# Patient Record
Sex: FEMALE | Race: WHITE | NOT HISPANIC OR LATINO | Employment: PART TIME | URBAN - METROPOLITAN AREA
[De-identification: names, ages, dates, MRNs, and addresses within clinical notes are randomized per-mention and may not be internally consistent; named-entity substitution may affect disease eponyms.]

---
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
## 2018-01-22 ENCOUNTER — NON-PROVIDER VISIT (OUTPATIENT)
Dept: MEDICAL GROUP | Facility: MEDICAL CENTER | Age: 18
End: 2018-01-22
Payer: COMMERCIAL

## 2018-01-22 DIAGNOSIS — Z30.42 DEPO-PROVERA CONTRACEPTIVE STATUS: ICD-10-CM

## 2018-01-22 PROCEDURE — 96372 THER/PROPH/DIAG INJ SC/IM: CPT | Performed by: NURSE PRACTITIONER

## 2018-01-22 RX ORDER — MEDROXYPROGESTERONE ACETATE 150 MG/ML
150 INJECTION, SUSPENSION INTRAMUSCULAR ONCE
Status: COMPLETED | OUTPATIENT
Start: 2018-01-22 | End: 2018-01-22

## 2018-01-22 RX ADMIN — MEDROXYPROGESTERONE ACETATE 150 MG: 150 INJECTION, SUSPENSION INTRAMUSCULAR at 09:57

## 2018-01-22 NOTE — PROGRESS NOTES
Willam Cunha is a 18 y.o. female here for a non-provider visit for Depo -Provera (medroxyprogesterone)  injection.    Reason for injection: Pt due   Order in MAR?: Yes  Patient supplied?:No  Minimum interval has been met for this injection (per MAR order): Yes    Order and dose verified by: Agustina Medeiros  Patient tolerated injection and no adverse effects were observed or reported: Yes    # of Administrations remaining in MAR: None     Pt due for next injection between April 9 - April 23

## 2018-04-26 ENCOUNTER — NON-PROVIDER VISIT (OUTPATIENT)
Dept: MEDICAL GROUP | Facility: MEDICAL CENTER | Age: 18
End: 2018-04-26
Payer: COMMERCIAL

## 2018-04-26 DIAGNOSIS — Z30.42 DEPO-PROVERA CONTRACEPTIVE STATUS: ICD-10-CM

## 2018-04-26 DIAGNOSIS — Z30.49 ENCOUNTER FOR SURVEILLANCE OF OTHER CONTRACEPTIVE: ICD-10-CM

## 2018-04-26 LAB
INT CON NEG: NEGATIVE
INT CON POS: POSITIVE
POC URINE PREGNANCY TEST: NEGATIVE

## 2018-04-26 PROCEDURE — 96372 THER/PROPH/DIAG INJ SC/IM: CPT | Performed by: INTERNAL MEDICINE

## 2018-04-26 PROCEDURE — 81025 URINE PREGNANCY TEST: CPT | Performed by: NURSE PRACTITIONER

## 2018-04-26 RX ORDER — MEDROXYPROGESTERONE ACETATE 150 MG/ML
150 INJECTION, SUSPENSION INTRAMUSCULAR ONCE
Status: COMPLETED | OUTPATIENT
Start: 2018-04-26 | End: 2018-04-26

## 2018-04-26 RX ADMIN — MEDROXYPROGESTERONE ACETATE 150 MG: 150 INJECTION, SUSPENSION INTRAMUSCULAR at 16:33

## 2018-04-26 NOTE — PROGRESS NOTES
Willam Cunha is a 18 y.o. here for a Depo Provera Injection.     Date of last Depo Provera Injection: 01/22/2018  Current date within therapeutic range?: No   Urine pregnancy test done (needed if out of date range): yes--Negative  Date of office visit:04/26/2018  Date of last pap (if > 21 years old)/ GYN exam: N/A  Dx: Contraceptive use    Order and dose verified by: XAVI Avalos  Patient tolerated injection and no adverse effects were observed or reported: Yes    # of Administrations remaining in MAR: 0  Next injection due between 07/12/2018 and 07/26/2018.

## 2018-07-08 ENCOUNTER — OFFICE VISIT (OUTPATIENT)
Dept: URGENT CARE | Facility: CLINIC | Age: 18
End: 2018-07-08
Payer: COMMERCIAL

## 2018-07-08 VITALS
SYSTOLIC BLOOD PRESSURE: 110 MMHG | BODY MASS INDEX: 24.59 KG/M2 | TEMPERATURE: 99 F | HEIGHT: 62 IN | RESPIRATION RATE: 16 BRPM | OXYGEN SATURATION: 100 % | DIASTOLIC BLOOD PRESSURE: 72 MMHG | WEIGHT: 133.6 LBS | HEART RATE: 95 BPM

## 2018-07-08 DIAGNOSIS — R09.89 THROAT TIGHTNESS: ICD-10-CM

## 2018-07-08 DIAGNOSIS — R05.9 COUGH: ICD-10-CM

## 2018-07-08 DIAGNOSIS — J03.90 TONSILLITIS: ICD-10-CM

## 2018-07-08 PROCEDURE — 99214 OFFICE O/P EST MOD 30 MIN: CPT | Performed by: PHYSICIAN ASSISTANT

## 2018-07-08 RX ORDER — DEXAMETHASONE 4 MG/1
8 TABLET ORAL DAILY
Qty: 4 TAB | Refills: 0 | Status: SHIPPED | OUTPATIENT
Start: 2018-07-08 | End: 2018-07-10

## 2018-07-08 RX ORDER — CEFUROXIME AXETIL 500 MG/1
500 TABLET ORAL 2 TIMES DAILY
Qty: 14 TAB | Refills: 0 | Status: SHIPPED | OUTPATIENT
Start: 2018-07-08 | End: 2018-07-15

## 2018-07-08 ASSESSMENT — ENCOUNTER SYMPTOMS
WHEEZING: 0
COUGH: 1
SORE THROAT: 1
CONSTITUTIONAL NEGATIVE: 1
SHORTNESS OF BREATH: 0
CARDIOVASCULAR NEGATIVE: 1
FEVER: 0
EYES NEGATIVE: 1

## 2018-07-08 NOTE — PROGRESS NOTES
"Subjective:      Willam Cunha is a 18 y.o. female who presents with Cough and Pharyngitis (1 week)            Cough   This is a new problem. The current episode started in the past 7 days. The problem has been unchanged. The problem occurs every few minutes. The cough is productive of sputum. Associated symptoms include a sore throat. Pertinent negatives include no fever, shortness of breath or wheezing. Nothing aggravates the symptoms. She has tried nothing for the symptoms. The treatment provided no relief. There is no history of asthma.   Pharyngitis    Associated symptoms include coughing. Pertinent negatives include no shortness of breath.       Review of Systems   Constitutional: Negative.  Negative for fever.   HENT: Positive for sore throat.         +phar./tons redn     Eyes: Negative.    Respiratory: Positive for cough. Negative for shortness of breath and wheezing.    Cardiovascular: Negative.    Skin: Negative.           Objective:     /72   Pulse 95   Temp 37.2 °C (99 °F)   Resp 16   Ht 1.575 m (5' 2\")   Wt 60.6 kg (133 lb 9.6 oz)   SpO2 100%   BMI 24.44 kg/m²      Physical Exam   Constitutional: She is oriented to person, place, and time. She appears well-developed and well-nourished. No distress.   HENT:   Head: Normocephalic and atraumatic.   Mouth/Throat: No oropharyngeal exudate.   +phar./tons redn     Eyes: Conjunctivae and EOM are normal. Pupils are equal, round, and reactive to light.   Neck: Normal range of motion. Neck supple.   Cardiovascular: Normal rate and regular rhythm.    Pulmonary/Chest: Effort normal and breath sounds normal. No respiratory distress. She has no wheezes. She has no rales.   Lymphadenopathy:     She has cervical adenopathy (+submandib. tend adenopathy).   Neurological: She is alert and oriented to person, place, and time.   Skin: Skin is warm and dry. Capillary refill takes less than 2 seconds.   Psychiatric: She has a normal mood and affect. Her " behavior is normal.   Nursing note and vitals reviewed.    Active Ambulatory Problems     Diagnosis Date Noted   • Encounter for surveillance of contraceptive pills 09/28/2015   • Mild intermittent asthma without complication 09/28/2015   • Anxiety 09/28/2015   • Uses birth control 01/15/2016   • GERD (gastroesophageal reflux disease) 01/15/2016   • Family planning 04/27/2017   • Chronic left shoulder pain 12/18/2017     Resolved Ambulatory Problems     Diagnosis Date Noted   • No Resolved Ambulatory Problems     Past Medical History:   Diagnosis Date   • Anxiety 9/28/2015   • Asthma    • Encounter for surveillance of contraceptive pills 9/28/2015   • Heart murmur      Current Outpatient Prescriptions on File Prior to Visit   Medication Sig Dispense Refill   • albuterol (PROAIR HFA) 108 (90 BASE) MCG/ACT Aero Soln inhalation aerosol Inhale 2 Puffs by mouth every 6 hours as needed for Shortness of Breath. 8.5 g 3   • hydrOXYzine (ATARAX) 25 MG Tab Take 1 Tab by mouth 3 times a day as needed for Itching or Anxiety. 30 Tab 0     No current facility-administered medications on file prior to visit.      Patient has no known allergies.  .,f  Patient has no known allergies.              Assessment/Plan:     ·  cough, ST      · Start w/MucinexD; nsaids; [hold rx for abx prn [conditional use] if sx persist/worsen]  ·

## 2018-08-01 ENCOUNTER — OFFICE VISIT (OUTPATIENT)
Dept: URGENT CARE | Facility: CLINIC | Age: 18
End: 2018-08-01
Payer: COMMERCIAL

## 2018-08-01 VITALS
HEART RATE: 90 BPM | TEMPERATURE: 99.3 F | DIASTOLIC BLOOD PRESSURE: 74 MMHG | RESPIRATION RATE: 16 BRPM | HEIGHT: 62 IN | BODY MASS INDEX: 24.84 KG/M2 | SYSTOLIC BLOOD PRESSURE: 124 MMHG | WEIGHT: 135 LBS | OXYGEN SATURATION: 98 %

## 2018-08-01 DIAGNOSIS — I80.9 INFLAMMATION OF VEIN: ICD-10-CM

## 2018-08-01 PROCEDURE — 99213 OFFICE O/P EST LOW 20 MIN: CPT | Performed by: PHYSICIAN ASSISTANT

## 2018-08-01 RX ORDER — METHYLPREDNISOLONE 4 MG/1
TABLET ORAL
Qty: 1 TAB | Refills: 0 | Status: SHIPPED | OUTPATIENT
Start: 2018-08-01 | End: 2018-09-04

## 2018-08-01 ASSESSMENT — ENCOUNTER SYMPTOMS
NEUROLOGICAL NEGATIVE: 1
CONSTITUTIONAL NEGATIVE: 1
WEAKNESS: 0
FEVER: 0
MUSCULOSKELETAL NEGATIVE: 1
NUMBNESS: 0

## 2018-08-02 NOTE — PROGRESS NOTES
"Subjective:      Willam Cunha is a 18 y.o. female who presents with Ankle Problem (x2days, vein on right ankle swollen, not going down, painful to the touch)            Other   This is a new (r ankle vein inflamed?, swollen; some soreness in area) problem. The current episode started today. The problem occurs constantly. The problem has been unchanged. Pertinent negatives include no fever, numbness or weakness. Nothing aggravates the symptoms. She has tried nothing for the symptoms. The treatment provided no relief.       Review of Systems   Constitutional: Negative.  Negative for fever.   Musculoskeletal: Negative.    Skin: Negative.    Neurological: Negative.  Negative for weakness and numbness.          Objective:     /74   Pulse 90   Temp 37.4 °C (99.3 °F)   Resp 16   Ht 1.575 m (5' 2\")   Wt 61.2 kg (135 lb)   SpO2 98%   BMI 24.69 kg/m²      Physical Exam   Constitutional: She is oriented to person, place, and time. She appears well-developed and well-nourished. No distress.   Musculoskeletal: She exhibits tenderness (r anteromedial ankle area, has a distended vein, itself no hard/cord like but gen ttp under/around vein area; no foot/ankle swelling). She exhibits no edema.   Neurological: She is alert and oriented to person, place, and time. She exhibits normal muscle tone. Coordination normal.   Skin: Skin is warm and dry. Capillary refill takes less than 2 seconds.   Psychiatric: She has a normal mood and affect. Her behavior is normal.   Nursing note and vitals reviewed.    Active Ambulatory Problems     Diagnosis Date Noted   • Encounter for surveillance of contraceptive pills 09/28/2015   • Mild intermittent asthma without complication 09/28/2015   • Anxiety 09/28/2015   • Uses birth control 01/15/2016   • GERD (gastroesophageal reflux disease) 01/15/2016   • Family planning 04/27/2017   • Chronic left shoulder pain 12/18/2017     Resolved Ambulatory Problems     Diagnosis Date Noted "   • No Resolved Ambulatory Problems     Past Medical History:   Diagnosis Date   • Anxiety 9/28/2015   • Asthma    • Encounter for surveillance of contraceptive pills 9/28/2015   • Heart murmur      Current Outpatient Prescriptions on File Prior to Visit   Medication Sig Dispense Refill   • albuterol (PROAIR HFA) 108 (90 BASE) MCG/ACT Aero Soln inhalation aerosol Inhale 2 Puffs by mouth every 6 hours as needed for Shortness of Breath. 8.5 g 3   • hydrOXYzine (ATARAX) 25 MG Tab Take 1 Tab by mouth 3 times a day as needed for Itching or Anxiety. 30 Tab 0     No current facility-administered medications on file prior to visit.      Social History     Social History   • Marital status: Single     Spouse name: N/A   • Number of children: N/A   • Years of education: N/A     Occupational History   • Not on file.     Social History Main Topics   • Smoking status: Never Smoker   • Smokeless tobacco: Never Used   • Alcohol use 0.0 oz/week      Comment: rare   • Drug use: No   • Sexual activity: Yes     Partners: Male     Birth control/ protection: Pill     Other Topics Concern   • Not on file     Social History Narrative   • No narrative on file     Family History   Problem Relation Age of Onset   • Diabetes Father    • Hypertension Maternal Grandmother    • Diabetes Maternal Grandfather    • Heart Disease Maternal Grandfather         chf     Patient has no known allergies.              Assessment/Plan:     ·  r ankle vein distension      · Trial rx meds;

## 2018-09-04 ENCOUNTER — HOSPITAL ENCOUNTER (OUTPATIENT)
Dept: LAB | Facility: MEDICAL CENTER | Age: 18
End: 2018-09-04
Attending: PHYSICIAN ASSISTANT
Payer: COMMERCIAL

## 2018-09-04 ENCOUNTER — OFFICE VISIT (OUTPATIENT)
Dept: MEDICAL GROUP | Facility: PHYSICIAN GROUP | Age: 18
End: 2018-09-04
Payer: COMMERCIAL

## 2018-09-04 VITALS
DIASTOLIC BLOOD PRESSURE: 62 MMHG | SYSTOLIC BLOOD PRESSURE: 110 MMHG | RESPIRATION RATE: 16 BRPM | OXYGEN SATURATION: 94 % | TEMPERATURE: 99.6 F | WEIGHT: 137.2 LBS | HEART RATE: 102 BPM | HEIGHT: 62 IN | BODY MASS INDEX: 25.25 KG/M2

## 2018-09-04 DIAGNOSIS — N91.2 AMENORRHEA: ICD-10-CM

## 2018-09-04 LAB — HCG SERPL QL: NEGATIVE

## 2018-09-04 PROCEDURE — 99213 OFFICE O/P EST LOW 20 MIN: CPT | Performed by: PHYSICIAN ASSISTANT

## 2018-09-04 PROCEDURE — 36415 COLL VENOUS BLD VENIPUNCTURE: CPT

## 2018-09-04 PROCEDURE — 84703 CHORIONIC GONADOTROPIN ASSAY: CPT

## 2018-09-04 ASSESSMENT — PAIN SCALES - GENERAL: PAINLEVEL: NO PAIN

## 2018-09-04 ASSESSMENT — PATIENT HEALTH QUESTIONNAIRE - PHQ9: CLINICAL INTERPRETATION OF PHQ2 SCORE: 0

## 2018-09-04 NOTE — ASSESSMENT & PLAN NOTE
Patient states she has not had a menstrual period in 2 months. She is concerned about being pregnant. She mentions that she stopped Depo-Provera 2 months ago and had unprotected sex 1-2 days after Depo-Provera .  States she took an at home pregnancy test 1.5 weeks ago and results were negative. States she has been experiencing intermittent episodes of a tugging sensation in pelvic region and generalized abdominal discomfort.  Denies nausea, vomiting, breast tenderness, nipple discharge or smell sensitivity.  Patient is requesting hCG testing.  She tells me that her mother tested negative with over-the-counter pregnancy test.

## 2018-09-04 NOTE — PROGRESS NOTES
Chief Complaint   Patient presents with   • Labs Only     requesting HCG labs        HISTORY OF PRESENT ILLNESS: Willam Cunha is an established 18 y.o. female here to discuss the evaluation and management of:    Amenorrhea  Patient states she has not had a menstrual period in 2 months. She is concerned about being pregnant. She mentions that she stopped Depo-Provera 2 months ago and had unprotected sex 1-2 days after Depo-Provera .  States she took an at home pregnancy test 1.5 weeks ago and results were negative. States she has been experiencing intermittent episodes of a tugging sensation in pelvic region and generalized abdominal discomfort.  Denies nausea, vomiting, breast tenderness, nipple discharge or smell sensitivity.  Patient is requesting hCG testing.  She tells me that her mother tested negative with over-the-counter pregnancy test.      Patient Active Problem List    Diagnosis Date Noted   • Amenorrhea 2018   • Chronic left shoulder pain 2017   • Family planning 2017   • Uses birth control 01/15/2016   • GERD (gastroesophageal reflux disease) 01/15/2016   • Encounter for surveillance of contraceptive pills 2015   • Mild intermittent asthma without complication 2015   • Anxiety 2015       Allergies:Patient has no known allergies.    No current outpatient prescriptions on file.     No current facility-administered medications for this visit.        Social History   Substance Use Topics   • Smoking status: Never Smoker   • Smokeless tobacco: Never Used   • Alcohol use 0.0 oz/week      Comment: Socially.        Family Status   Relation Status   • Mo Alive   • Fa Alive   • Bro Alive   • MGMo Alive   • MGFa Alive     Family History   Problem Relation Age of Onset   • Diabetes Father    • Hypertension Maternal Grandmother    • Diabetes Maternal Grandfather    • Heart Disease Maternal Grandfather         chf       ROS:  Review of Systems   Constitutional:  "Negative for fever, chills, weight loss and malaise/fatigue.   HENT: Negative for ear pain, nosebleeds, congestion, sore throat and neck pain.    Eyes: Negative for blurred vision.   Respiratory: Negative for cough, sputum production, shortness of breath and wheezing.    Cardiovascular: Negative for chest pain, palpitations, orthopnea and leg swelling.   Gastrointestinal: Negative for heartburn, nausea, vomiting and abdominal pain.   Genitourinary: Negative for dysuria, urgency and frequency.  Positive for amenorrhea.  Musculoskeletal: Negative for myalgias, back pain and joint pain.   Skin: Negative for rash and itching.   Neurological: Negative for dizziness, tingling, tremors, sensory change, focal weakness and headaches.   Endo/Heme/Allergies: Does not bruise/bleed easily.   Psychiatric/Behavioral: Negative for depression, suicidal ideas and memory loss.  The patient is not nervous/anxious and does not have insomnia.    All other systems reviewed and are negative except as in HPI.    Exam: Blood pressure 110/62, pulse (!) 102, temperature 37.6 °C (99.6 °F), resp. rate 16, height 1.575 m (5' 2\"), weight 62.2 kg (137 lb 3.2 oz), SpO2 94 %. Body mass index is 25.09 kg/m².  General: Normal appearing. No distress.  HEENT: Normocephalic. Eyes conjunctiva clear lids without ptosis, pupils equal and reactive to light accommodation, ears normal shape and contour, canals are clear bilaterally, tympanic membranes are benign, nasal mucosa benign, oropharynx is without erythema, edema or exudates.   Neck: Supple without JVD or bruit. Thyroid is not enlarged.  Pulmonary: Clear to ausculation.  Normal effort. No rales, ronchi, or wheezing.  Cardiovascular: Regular rate and rhythm without murmur.  Abdomen: Soft, nontender, nondistended. Normal bowel sounds. Liver and spleen are not palpable  Neurologic: Grossly nonfocal.  Cranial nerves are normal.   Lymph: No cervical, supraclavicular or axillary lymph nodes are " palpable  Skin: Warm and dry.  No rashes or suspicious skin lesions.  Musculoskeletal: Normal gait. No extremity cyanosis, clubbing, or edema.  Psych: Normal mood and affect. Alert and oriented x3. Judgment and insight is normal.    Medical decision-making and discussion:  1. Amenorrhea  HCG qualitative serum test has been ordered to further evaluate patient.  Patient will be contacted with results.  Patient is inquiring about oral contraceptive as an birth control method if results are negative.  Advised patient to follow-up with her PCP to discuss oral birth control.      - HCG QUAL SERUM; Future      Please note that this dictation was created using voice recognition software. I have made every reasonable attempt to correct obvious errors, but I expect that there are errors of grammar and possibly content that I did not discover before finalizing the note.        Return if symptoms worsen or fail to improve.

## 2018-09-05 ENCOUNTER — TELEPHONE (OUTPATIENT)
Dept: MEDICAL GROUP | Facility: PHYSICIAN GROUP | Age: 18
End: 2018-09-05

## 2018-09-05 NOTE — TELEPHONE ENCOUNTER
Phone Number Called: [phone] (home)       Message: Pt informed by phone.    Left Message for patient to call back: N\A

## 2018-09-05 NOTE — TELEPHONE ENCOUNTER
----- Message from Kim Crane P.A.-C. sent at 9/4/2018  8:22 PM PDT -----  Please call patient. I have reviewed patient's lab work results and results do not indicate patient is pregnant.    Thank you,    Milla QUEEN

## 2018-09-10 ENCOUNTER — OFFICE VISIT (OUTPATIENT)
Dept: MEDICAL GROUP | Facility: MEDICAL CENTER | Age: 18
End: 2018-09-10
Payer: COMMERCIAL

## 2018-09-10 VITALS
BODY MASS INDEX: 25.4 KG/M2 | TEMPERATURE: 98.6 F | HEIGHT: 62 IN | WEIGHT: 138 LBS | RESPIRATION RATE: 16 BRPM | OXYGEN SATURATION: 98 % | DIASTOLIC BLOOD PRESSURE: 62 MMHG | HEART RATE: 98 BPM | SYSTOLIC BLOOD PRESSURE: 110 MMHG

## 2018-09-10 DIAGNOSIS — Z30.09 FAMILY PLANNING: ICD-10-CM

## 2018-09-10 DIAGNOSIS — Z23 NEED FOR INFLUENZA VACCINATION: ICD-10-CM

## 2018-09-10 PROCEDURE — 90686 IIV4 VACC NO PRSV 0.5 ML IM: CPT | Performed by: NURSE PRACTITIONER

## 2018-09-10 PROCEDURE — 99214 OFFICE O/P EST MOD 30 MIN: CPT | Mod: 25 | Performed by: NURSE PRACTITIONER

## 2018-09-10 PROCEDURE — 90471 IMMUNIZATION ADMIN: CPT | Performed by: NURSE PRACTITIONER

## 2018-09-10 RX ORDER — DROSPIRENONE AND ETHINYL ESTRADIOL 0.03MG-3MG
1 KIT ORAL DAILY
Qty: 84 TAB | Refills: 3 | Status: SHIPPED | OUTPATIENT
Start: 2018-09-10

## 2018-09-10 NOTE — PROGRESS NOTES
"Subjective:     Chief Complaint   Patient presents with   • Contraception     DISCUSS DIFF OPTIONS     Willam Cunha is a 18 y.o. female here today to follow up on:    Family planning  Patient had been on Depo-Provera for the last 2 years, last dose about 5 months ago.  She has not had a menstrual period and was seen last week by Kim DAVIS for this, pregnancy test was negative.  She is now requesting oral contraceptive, had taken a birth control pill prior to starting the Depo and did well with this.  No history of DVT, clotting disorder, migraine with aura, hypertension.  She is in a consistent relationship, denies concerns for STDs.  No tobacco use    Current medicines (including changes today)  Current Outpatient Prescriptions   Medication Sig Dispense Refill   • drospirenone-ethinyl estradiol (RACHAEL) 3-0.03 MG per tablet Take 1 Tab by mouth every day. 84 Tab 3     No current facility-administered medications for this visit.      She  has a past medical history of Anxiety (9/28/2015); Asthma; Encounter for surveillance of contraceptive pills (9/28/2015); and Heart murmur.    ROS included above     Objective:     Blood pressure 110/62, pulse 98, temperature 37 °C (98.6 °F), resp. rate 16, height 1.575 m (5' 2\"), weight 62.6 kg (138 lb), SpO2 98 %. Body mass index is 25.24 kg/m².     Physical Exam:  General: Alert, oriented in no acute distress.  Eye contact is good, speech is normal, affect calm  Lungs: clear to auscultation bilaterally, normal effort, no wheeze/ rhonchi/ rales.  CV: regular rate and rhythm, S1, S2, no murmur  Abdomen: soft, nontender, no hepatosplenomegaly  Ext: no edema, color normal, vascularity normal, temperature normal    Assessment and Plan:   The following treatment plan was discussed   1. Family planning   recent negative pregnancy test, requesting OCP.  Will start:  drospirenone-ethinyl estradiol (RACHAEL) 3-0.03 MG per tablet  We discussed that birth control pills can " increase risk of blood clots, liver tumors, gallbladder disease, and stroke. Side effects can include headache, constipation nausea. They do not protect against STDs. They are not effective if not taken everyday, if more than one pill is missed a backup method should be used. Antibiotics can make the pill less effective, she takes antibiotic a backup method should be used. Any unusual headache leg pain chest pain shortness of breath difficulty speaking or moving should be addressed immediately.  Pap next year     2. Need for influenza vaccination  I have placed the below orders and discussed them with an approved delegating provider. The MA is performing the below orders under the direction of Dr. Nugent    Flu Quad Inj >3 Year Pre-Filled PF       Followup: annually         Please note that this dictation was created using voice recognition software. I have worked with consultants from the vendor as well as technical experts from [facility] [employer] to optimize the interface. I have made every reasonable attempt to correct obvious errors, but I expect that there are errors of grammar and possibly content that I did not discover before finalizing the note.

## 2018-09-10 NOTE — ASSESSMENT & PLAN NOTE
Patient had been on Depo-Provera for the last 2 years, last dose about 5 months ago.  She has not had a menstrual period and was seen last week by Kim DAVIS for this, pregnancy test was negative.  She is now requesting oral contraceptive, had taken a birth control pill prior to starting the Depo and did well with this.  No history of DVT, clotting disorder, migraine with aura, hypertension.  She is in a consistent relationship, denies concerns for STDs.

## 2020-07-08 ENCOUNTER — OFFICE VISIT (OUTPATIENT)
Dept: URGENT CARE | Facility: CLINIC | Age: 20
End: 2020-07-08
Payer: OTHER GOVERNMENT

## 2020-07-08 ENCOUNTER — HOSPITAL ENCOUNTER (OUTPATIENT)
Facility: MEDICAL CENTER | Age: 20
End: 2020-07-08
Attending: PHYSICIAN ASSISTANT
Payer: OTHER GOVERNMENT

## 2020-07-08 ENCOUNTER — APPOINTMENT (OUTPATIENT)
Dept: RADIOLOGY | Facility: IMAGING CENTER | Age: 20
End: 2020-07-08
Attending: PHYSICIAN ASSISTANT
Payer: OTHER GOVERNMENT

## 2020-07-08 VITALS
WEIGHT: 123 LBS | RESPIRATION RATE: 14 BRPM | DIASTOLIC BLOOD PRESSURE: 72 MMHG | HEART RATE: 87 BPM | SYSTOLIC BLOOD PRESSURE: 110 MMHG | TEMPERATURE: 98 F | HEIGHT: 61 IN | OXYGEN SATURATION: 97 % | BODY MASS INDEX: 23.22 KG/M2

## 2020-07-08 DIAGNOSIS — J34.89 SINUS PRESSURE: ICD-10-CM

## 2020-07-08 DIAGNOSIS — J45.901 ACUTE EXACERBATION OF EXTRINSIC ASTHMA: ICD-10-CM

## 2020-07-08 DIAGNOSIS — R06.02 SHORTNESS OF BREATH: ICD-10-CM

## 2020-07-08 DIAGNOSIS — R05.9 COUGH: ICD-10-CM

## 2020-07-08 LAB — COVID ORDER STATUS COVID19: NORMAL

## 2020-07-08 PROCEDURE — 99214 OFFICE O/P EST MOD 30 MIN: CPT | Mod: CS | Performed by: PHYSICIAN ASSISTANT

## 2020-07-08 PROCEDURE — U0003 INFECTIOUS AGENT DETECTION BY NUCLEIC ACID (DNA OR RNA); SEVERE ACUTE RESPIRATORY SYNDROME CORONAVIRUS 2 (SARS-COV-2) (CORONAVIRUS DISEASE [COVID-19]), AMPLIFIED PROBE TECHNIQUE, MAKING USE OF HIGH THROUGHPUT TECHNOLOGIES AS DESCRIBED BY CMS-2020-01-R: HCPCS

## 2020-07-08 PROCEDURE — 71046 X-RAY EXAM CHEST 2 VIEWS: CPT | Mod: TC,CS | Performed by: PHYSICIAN ASSISTANT

## 2020-07-08 RX ORDER — AMOXICILLIN AND CLAVULANATE POTASSIUM 875; 125 MG/1; MG/1
1 TABLET, FILM COATED ORAL
COMMUNITY
Start: 2020-07-06 | End: 2020-07-16

## 2020-07-08 RX ORDER — BENZONATATE 100 MG/1
200 CAPSULE ORAL 3 TIMES DAILY PRN
Qty: 60 CAP | Refills: 0 | Status: SHIPPED
Start: 2020-07-08

## 2020-07-08 RX ORDER — ALBUTEROL SULFATE 90 UG/1
2 AEROSOL, METERED RESPIRATORY (INHALATION)
COMMUNITY
Start: 2020-07-06 | End: 2021-07-06

## 2020-07-08 ASSESSMENT — ENCOUNTER SYMPTOMS
SPUTUM PRODUCTION: 0
HEMOPTYSIS: 0
FEVER: 0
WHEEZING: 1
CHILLS: 0
SHORTNESS OF BREATH: 1
COUGH: 1
MYALGIAS: 1

## 2020-07-08 NOTE — PROGRESS NOTES
"Subjective:   Willam Cunha  is a 20 y.o. female who presents for Cough (the [facility] clinic told her to come get tested for covid since she coughing really bad x 2 weeks )    This is a new problem.  Patient presents to urgent care requesting testing for COVID-19.  Patient reports 2-week history of cough, congestion, wheezing, mild shortness of breath with fatigue and generalized body aches.  Patient reports increasing nasal congestion.  She denies any facial pain or pressure or severe sore throat.  Denies loss of taste or smell, nausea, vomiting but does admit to mild diarrhea.  Patient has had no known exposure to COVID however, she does work in a grocery store environment.    Patient had a visit 2 days ago at [facility].  She was prescribed Augmentin for probable sinusitis and was recommended to present for COVID testing.      Cough   Associated symptoms include myalgias, shortness of breath and wheezing. Pertinent negatives include no chills, fever or hemoptysis.     Review of Systems   Constitutional: Positive for malaise/fatigue. Negative for chills and fever.   HENT: Positive for congestion.    Respiratory: Positive for cough, shortness of breath and wheezing. Negative for hemoptysis and sputum production.    Musculoskeletal: Positive for myalgias.   All other systems reviewed and are negative.    No Known Allergies  Reviewed past medical, surgical , social and family history.  Reviewed prescription and over-the-counter medications with patient and electronic health record today.     Objective:   /72   Pulse 87   Temp 36.7 °C (98 °F) (Temporal)   Resp 14   Ht 1.551 m (5' 1.05\")   Wt 55.8 kg (123 lb)   SpO2 97%   BMI 23.20 kg/m²   Physical Exam  Vitals signs reviewed.   Constitutional:       General: She is not in acute distress.     Appearance: She is well-developed. She is not ill-appearing or toxic-appearing.   HENT:      Head: Normocephalic and atraumatic.      Right Ear: Tympanic " membrane, ear canal and external ear normal.      Left Ear: Tympanic membrane, ear canal and external ear normal.      Nose: Mucosal edema and congestion present.      Right Turbinates: Swollen.      Left Turbinates: Swollen.      Right Sinus: No maxillary sinus tenderness or frontal sinus tenderness.      Left Sinus: No maxillary sinus tenderness or frontal sinus tenderness.      Mouth/Throat:      Lips: Pink. No lesions.      Mouth: Mucous membranes are moist.      Pharynx: Oropharynx is clear. Uvula midline. No oropharyngeal exudate.   Eyes:      General: Lids are normal.      Extraocular Movements: Extraocular movements intact.      Conjunctiva/sclera: Conjunctivae normal.      Pupils: Pupils are equal, round, and reactive to light.   Neck:      Musculoskeletal: Normal range of motion and neck supple.   Cardiovascular:      Rate and Rhythm: Normal rate and regular rhythm.      Heart sounds: Normal heart sounds. No murmur. No friction rub. No gallop.    Pulmonary:      Effort: Pulmonary effort is normal. No tachypnea or respiratory distress.      Breath sounds: Examination of the right-upper field reveals wheezing. Examination of the left-upper field reveals wheezing. Examination of the right-middle field reveals wheezing. Examination of the left-middle field reveals wheezing. Examination of the right-lower field reveals wheezing and rhonchi. Examination of the left-lower field reveals wheezing and rhonchi. Wheezing and rhonchi present. No decreased breath sounds or rales.   Abdominal:      General: Bowel sounds are normal. There is no distension.      Palpations: Abdomen is soft. There is no mass.      Tenderness: There is no abdominal tenderness. There is no guarding or rebound.   Musculoskeletal: Normal range of motion.         General: No tenderness or deformity.   Lymphadenopathy:      Head:      Right side of head: No submental, submandibular or tonsillar adenopathy.      Left side of head: No submental,  submandibular or tonsillar adenopathy.      Cervical: No cervical adenopathy.      Upper Body:      Right upper body: No supraclavicular adenopathy.      Left upper body: No supraclavicular adenopathy.   Skin:     General: Skin is warm and dry.      Findings: No rash.   Neurological:      Mental Status: She is alert and oriented to person, place, and time.      Cranial Nerves: Cranial nerves are intact. No cranial nerve deficit.      Sensory: Sensation is intact. No sensory deficit.      Motor: Motor function is intact.      Coordination: Coordination is intact. Coordination normal.      Gait: Gait is intact.   Psychiatric:         Attention and Perception: Attention normal.         Mood and Affect: Mood and affect normal.         Speech: Speech normal.         Behavior: Behavior normal. Behavior is cooperative.         Thought Content: Thought content normal.         Judgment: Judgment normal.           Assessment/Plan:   1. Acute exacerbation of extrinsic asthma  - COVID/SARS CoV-2 PCR; Future  - benzonatate (TESSALON) 100 MG Cap; Take 2 Caps by mouth 3 times a day as needed.  Dispense: 60 Cap; Refill: 0  - DX-CHEST-2 VIEWS; Future    2. Cough  - COVID/SARS CoV-2 PCR; Future  - benzonatate (TESSALON) 100 MG Cap; Take 2 Caps by mouth 3 times a day as needed.  Dispense: 60 Cap; Refill: 0  - DX-CHEST-2 VIEWS; Future    3. Shortness of breath  - COVID/SARS CoV-2 PCR; Future  - benzonatate (TESSALON) 100 MG Cap; Take 2 Caps by mouth 3 times a day as needed.  Dispense: 60 Cap; Refill: 0  - DX-CHEST-2 VIEWS; Future    4. Sinus pressure  - COVID/SARS CoV-2 PCR; Future  - benzonatate (TESSALON) 100 MG Cap; Take 2 Caps by mouth 3 times a day as needed.  Dispense: 60 Cap; Refill: 0  - DX-CHEST-2 VIEWS; Future    Chest x-ray is obtained, read by radiologist and reviewed by myself with findings as follows:   No active disease    Testing is performed for COVID and patient will be contacted with results once available.  Patient  is instructed to self isolate and is given printed instructions regarding self-isolation.  She is provided Tessalon Perles as needed for cough.  If COVID is negative we will then consider addition of prednisone to regimen.  Continue albuterol and antibiotic as previously prescribed by outside provider.    Upon entering exam room I ensured patient was wearing a mask.  This provider wore a mask for the entire visit.  Patient wore mask entire visit except for a brief period while examining oropharynx.        Differential diagnosis, natural history, supportive care, and indications for immediate follow-up discussed.     Red flag warning symptoms and strict ER/follow-up precautions given.  The patient demonstrated a good understanding and agreed with the treatment plan.  Please note that this note was created using voice recognition speech to text software. Every effort has been made to correct obvious errors.  However, I expect there are errors of grammar and possibly context that were not discovered prior to finalizing the note  CARTER Sharp PA-C

## 2020-07-08 NOTE — PATIENT INSTRUCTIONS

## 2020-07-09 LAB
SARS-COV-2 RNA RESP QL NAA+PROBE: NOTDETECTED
SPECIMEN SOURCE: NORMAL

## 2020-07-10 ENCOUNTER — TELEPHONE (OUTPATIENT)
Dept: URGENT CARE | Facility: CLINIC | Age: 20
End: 2020-07-10

## 2020-07-10 NOTE — TELEPHONE ENCOUNTER
Patient notified by telephone negative COVID.  Patient reports she is improving and does not feel she needs prednisone at this time.  No change to plan.  CARTER Sharp PA-C